# Patient Record
Sex: MALE | Race: ASIAN | NOT HISPANIC OR LATINO | Employment: FULL TIME | URBAN - METROPOLITAN AREA
[De-identification: names, ages, dates, MRNs, and addresses within clinical notes are randomized per-mention and may not be internally consistent; named-entity substitution may affect disease eponyms.]

---

## 2019-10-31 ENCOUNTER — APPOINTMENT (OUTPATIENT)
Dept: RADIOLOGY | Facility: IMAGING CENTER | Age: 29
End: 2019-10-31
Attending: FAMILY MEDICINE
Payer: COMMERCIAL

## 2019-10-31 ENCOUNTER — OFFICE VISIT (OUTPATIENT)
Dept: URGENT CARE | Facility: CLINIC | Age: 29
End: 2019-10-31
Payer: COMMERCIAL

## 2019-10-31 VITALS
HEIGHT: 68 IN | DIASTOLIC BLOOD PRESSURE: 74 MMHG | SYSTOLIC BLOOD PRESSURE: 112 MMHG | OXYGEN SATURATION: 95 % | HEART RATE: 80 BPM | BODY MASS INDEX: 21.98 KG/M2 | TEMPERATURE: 98 F | RESPIRATION RATE: 12 BRPM | WEIGHT: 145 LBS

## 2019-10-31 DIAGNOSIS — S99.912A INJURY OF LEFT ANKLE, INITIAL ENCOUNTER: ICD-10-CM

## 2019-10-31 DIAGNOSIS — S93.402A SPRAIN OF LEFT ANKLE, UNSPECIFIED LIGAMENT, INITIAL ENCOUNTER: ICD-10-CM

## 2019-10-31 PROCEDURE — 99203 OFFICE O/P NEW LOW 30 MIN: CPT | Performed by: FAMILY MEDICINE

## 2019-10-31 PROCEDURE — 73610 X-RAY EXAM OF ANKLE: CPT | Mod: TC,LT | Performed by: FAMILY MEDICINE

## 2019-11-01 NOTE — PATIENT INSTRUCTIONS
Follow up if not significantly improved as expected in 7-10 days, sooner if any worsening or new symptoms      Ankle Sprain  Introduction  An ankle sprain is a stretch or tear in one of the tough tissues (ligaments) in your ankle.  Follow these instructions at home:  · Rest your ankle.  · Take over-the-counter and prescription medicines only as told by your doctor.  · For 2-3 days, keep your ankle higher than the level of your heart (elevated) as much as possible.  · If directed, put ice on the area:  ¨ Put ice in a plastic bag.  ¨ Place a towel between your skin and the bag.  ¨ Leave the ice on for 20 minutes, 2-3 times a day.  · If you were given a brace:  ¨ Wear it as told.  ¨ Take it off to shower or bathe.  ¨ Try not to move your ankle much, but wiggle your toes from time to time. This helps to prevent swelling.  · If you were given an elastic bandage (dressing):  ¨ Take it off when you shower or bathe.  ¨ Try not to move your ankle much, but wiggle your toes from time to time. This helps to prevent swelling.  ¨ Adjust the bandage to make it more comfortable if it feels too tight.  ¨ Loosen the bandage if you lose feeling in your foot, your foot tingles, or your foot gets cold and blue.  · If you have crutches, use them as told by your doctor. Continue to use them until you can walk without feeling pain in your ankle.  Contact a doctor if:  · Your bruises or swelling are quickly getting worse.  · Your pain does not get better after you take medicine.  Get help right away if:  · You cannot feel your toes or foot.  · Your toes or your foot looks blue.  · You have very bad pain that gets worse.  This information is not intended to replace advice given to you by your health care provider. Make sure you discuss any questions you have with your health care provider.  Document Released: 06/05/2009 Document Revised: 05/25/2017 Document Reviewed: 07/19/2016  © 2017 [employer]

## 2019-11-04 ASSESSMENT — ENCOUNTER SYMPTOMS
LOSS OF MOTION: 0
TINGLING: 0
LOSS OF SENSATION: 0
INABILITY TO BEAR WEIGHT: 0
MUSCLE WEAKNESS: 0
NUMBNESS: 0

## 2019-11-04 NOTE — PROGRESS NOTES
"Subjective:      Simon Keenan is a 29 y.o. male who presents with Ankle Pain (Pt. tripped off the side walk and rolled his L ankle.  He is having pain and swelling. )            Ankle Injury    The incident occurred 6 to 12 hours ago. The incident occurred in the street. The injury mechanism was an inversion injury. The pain is present in the left ankle. The pain is at a severity of 3/10. The pain is mild. The pain has been intermittent since onset. Pertinent negatives include no inability to bear weight, loss of motion, loss of sensation, muscle weakness, numbness or tingling. He reports no foreign bodies present. Nothing aggravates the symptoms. The treatment provided no relief.   no other injruies    Review of Systems   Neurological: Negative for tingling and numbness.   All other systems reviewed and are negative.         Objective:     /74   Pulse 80   Temp 36.7 °C (98 °F) (Temporal)   Resp 12   Ht 1.727 m (5' 8\")   Wt 65.8 kg (145 lb)   SpO2 95%   BMI 22.05 kg/m²      Physical Exam  Constitutional:       General: He is not in acute distress.     Appearance: He is not ill-appearing, toxic-appearing or diaphoretic.   HENT:      Head: Normocephalic and atraumatic.      Right Ear: External ear normal.      Left Ear: External ear normal.      Nose: Nose normal.   Eyes:      Conjunctiva/sclera: Conjunctivae normal.   Cardiovascular:      Rate and Rhythm: Normal rate.   Pulmonary:      Effort: Pulmonary effort is normal.      Breath sounds: No stridor.   Musculoskeletal:      Left ankle: He exhibits decreased range of motion and swelling. He exhibits no ecchymosis, no deformity, no laceration and normal pulse. Tenderness. Lateral malleolus tenderness found. No medial malleolus, no AITFL, no CF ligament, no posterior TFL, no head of 5th metatarsal and no proximal fibula tenderness found. Achilles tendon normal.   Skin:     General: Skin is warm.      Coloration: Skin is not jaundiced or pale.      Findings: " No bruising, erythema, lesion or rash.   Neurological:      Mental Status: He is alert.   Psychiatric:         Mood and Affect: Mood normal.        xray left ankle is negative for acute x on my read      Assessment/Plan:       1. Sprain of left ankle, unspecified ligament, initial encounter  - Ankle Splint Air Cast    2. Injury of left ankle, initial encounter  - DX-ANKLE 3+ VIEWS LEFT; Future      RICE  OTC NSAID's as needed  Plan per orders and instructions  Warning signs reviewed  Follow up if not significantly improved as expected in 7-10 days, sooner if any worsening or new symptoms

## 2019-11-09 ENCOUNTER — OFFICE VISIT (OUTPATIENT)
Dept: URGENT CARE | Facility: CLINIC | Age: 29
End: 2019-11-09
Payer: COMMERCIAL

## 2019-11-09 VITALS
SYSTOLIC BLOOD PRESSURE: 128 MMHG | HEART RATE: 94 BPM | BODY MASS INDEX: 24.4 KG/M2 | TEMPERATURE: 98.8 F | WEIGHT: 161 LBS | RESPIRATION RATE: 16 BRPM | OXYGEN SATURATION: 99 % | DIASTOLIC BLOOD PRESSURE: 78 MMHG | HEIGHT: 68 IN

## 2019-11-09 DIAGNOSIS — J01.00 ACUTE NON-RECURRENT MAXILLARY SINUSITIS: ICD-10-CM

## 2019-11-09 PROCEDURE — 99214 OFFICE O/P EST MOD 30 MIN: CPT | Performed by: NURSE PRACTITIONER

## 2019-11-09 RX ORDER — FLUTICASONE PROPIONATE 50 MCG
1 SPRAY, SUSPENSION (ML) NASAL 2 TIMES DAILY
Qty: 16 G | Refills: 0 | Status: SHIPPED | OUTPATIENT
Start: 2019-11-09

## 2019-11-09 RX ORDER — AMOXICILLIN AND CLAVULANATE POTASSIUM 875; 125 MG/1; MG/1
1 TABLET, FILM COATED ORAL 2 TIMES DAILY
Qty: 14 TAB | Refills: 0 | Status: SHIPPED | OUTPATIENT
Start: 2019-11-09 | End: 2019-11-16

## 2019-11-09 NOTE — PROGRESS NOTES
Chief Complaint   Patient presents with   • Sinusitis     feels feverish, achy body,  sinus headache with drainage and light cough x 4 days.       HISTORY OF PRESENT ILLNESS: Patient is a 29 y.o. male who presents today due to four days of nasal congestion, fever, chills, headache, mild cough, and sinus pressure which radiate to his teeth. He denies associated difficulty breathing, confusion, nausea, vomiting or diarrhea. He has tried OTC cold/sinus medication at home without much improvement.  In addition, the patient has taken a few doses of some leftover antibiotic he had from [place], he cannot recall the name of the medication.  Denies a history of seasonal allergies or sinus infections in the past. No known ill contacts at home.   There are no active problems to display for this patient.      Allergies:Patient has no known allergies.    Current Outpatient Medications Ordered in Epic   Medication Sig Dispense Refill   • fluticasone (FLONASE) 50 MCG/ACT nasal spray Spray 1 Spray in nose 2 times a day. 16 g 0   • amoxicillin-clavulanate (AUGMENTIN) 875-125 MG Tab Take 1 Tab by mouth 2 times a day for 7 days. 14 Tab 0     No current Epic-ordered facility-administered medications on file.        History reviewed. No pertinent past medical history.    Social History     Tobacco Use   • Smoking status: Never Smoker   • Smokeless tobacco: Never Used   Substance Use Topics   • Alcohol use: No   • Drug use: No       No family status information on file.   History reviewed. No pertinent family history.    ROS:  Review of Systems   Constitutional: Positive for fever, chills, fatigue. Negative for weight loss.   HENT: Positive for left maxillary sinus pressure, sore throat, nasal congestion. Negative for ear pain, nosebleeds, neck pain.    Eyes: Negative for vision changes.   Neuro: Positive for headache. Negative for sensory changes, weakness, seizure, LOC.  Cardiovascular: Negative for chest pain, palpitations, orthopnea  "and leg swelling.   Respiratory: Positive for mild cough.  Negative for sputum production, shortness of breath and wheezing.   Gastrointestinal: Negative for abdominal pain, nausea, vomiting or diarrhea.    Skin: Negative for rash, diaphoresis.     Exam:  /78 (BP Location: Right arm, Patient Position: Sitting, BP Cuff Size: Adult)   Pulse 94   Temp 37.1 °C (98.8 °F) (Temporal)   Resp 16   Ht 1.727 m (5' 8\")   Wt 73 kg (161 lb)   SpO2 99%   General: well-nourished, well-developed male in NAD  Head: normocephalic, atraumatic  Eyes: PERRLA, no conjunctival injection, acuity grossly intact, lids normal.  Ears: normal shape and symmetry, no tenderness, no discharge. External canals are without any significant edema or erythema. Tympanic membranes are without any inflammation, no effusion. Gross auditory acuity is intact.  Nose: symmetrical without tenderness, erythema and swelling noted bilateral turbinates, clear discharge.  Left maxillary sinus pain with head in dependent position.  Mouth/Throat: reasonable hygiene, no exudates or tonsillar enlargement. Erythema is present.   Neck: no masses, range of motion within normal limits, no tracheal deviation. No obvious thyroid enlargement.   Lymph: no cervical adenopathy. No supraclavicular adenopathy.   Neuro: alert and oriented. Cranial nerves 1-12 grossly intact. No sensory deficit.   Cardiovascular: regular rate and rhythm. No edema.  Pulmonary: no distress. Chest is symmetrical with respiration, no wheezes, crackles, or rhonchi.   Musculoskeletal: no clubbing, appropriate muscle tone, gait is stable.  Skin: warm, dry, intact, no clubbing, no cyanosis, no rashes.   Psych: appropriate mood, affect, judgement.         Assessment/Plan:  1. Acute non-recurrent maxillary sinusitis  fluticasone (FLONASE) 50 MCG/ACT nasal spray    amoxicillin-clavulanate (AUGMENTIN) 875-125 MG Tab         Antibiotic as directed, potential side effects of medication discussed. " Probiotic use encouraged. Flonase as directed.   Nasal washes with sterile saline solution daily. Sleep with HOB elevated, humidifier at night, rest, increase fluid intake.   Supportive care, differential diagnoses, and indications for immediate follow-up discussed with patient.   Pathogenesis of diagnosis discussed including typical length and natural progression.   Instructed to return to clinic or nearest emergency department for any change in condition, further concerns, or worsening of symptoms.  Patient states understanding of the plan of care and discharge instructions.  Instructed to make an appointment, for follow up, with his primary care provider.        Please note that this dictation was created using voice recognition software. I have made every reasonable attempt to correct obvious errors, but I expect that there are errors of grammar and possibly content that I did not discover before finalizing the note.      SALLY Nichols.